# Patient Record
Sex: FEMALE | Race: WHITE | NOT HISPANIC OR LATINO
[De-identification: names, ages, dates, MRNs, and addresses within clinical notes are randomized per-mention and may not be internally consistent; named-entity substitution may affect disease eponyms.]

---

## 2022-12-27 PROBLEM — Z00.00 ENCOUNTER FOR PREVENTIVE HEALTH EXAMINATION: Status: ACTIVE | Noted: 2022-12-27

## 2023-01-04 ENCOUNTER — NON-APPOINTMENT (OUTPATIENT)
Age: 63
End: 2023-01-04

## 2023-01-04 ENCOUNTER — APPOINTMENT (OUTPATIENT)
Dept: VASCULAR SURGERY | Facility: CLINIC | Age: 63
End: 2023-01-04
Payer: COMMERCIAL

## 2023-01-04 VITALS
WEIGHT: 213 LBS | DIASTOLIC BLOOD PRESSURE: 90 MMHG | SYSTOLIC BLOOD PRESSURE: 149 MMHG | BODY MASS INDEX: 36.37 KG/M2 | HEIGHT: 64 IN | HEART RATE: 62 BPM

## 2023-01-04 DIAGNOSIS — I10 ESSENTIAL (PRIMARY) HYPERTENSION: ICD-10-CM

## 2023-01-04 DIAGNOSIS — I83.93 ASYMPTOMATIC VARICOSE VEINS OF BILATERAL LOWER EXTREMITIES: ICD-10-CM

## 2023-01-04 PROCEDURE — 93971 EXTREMITY STUDY: CPT

## 2023-01-04 PROCEDURE — 99204 OFFICE O/P NEW MOD 45 MIN: CPT

## 2023-01-04 RX ORDER — LOSARTAN POTASSIUM 50 MG/1
50 TABLET, FILM COATED ORAL
Refills: 0 | Status: ACTIVE | COMMUNITY

## 2023-01-06 NOTE — PHYSICAL EXAM
[Varicose Veins Of Lower Extremities] : present [Varicose Veins Of The Left Leg] : of the left leg [Ankle Swelling On The Left] : moderate [Ankle Swelling (On Exam)] : not present [] : not present [de-identified] : WN/WD [FreeTextEntry1] : LLE: varicose vein located at the distal thigh and calf.  [de-identified] : FROM

## 2023-01-06 NOTE — HISTORY OF PRESENT ILLNESS
[FreeTextEntry1] : 61 y/o F referred by Dr Corrigan. She has PMHx of HTN. Patient presents for evaluation of SVT in the L leg. She describes veins on her L distal thigh and calf were large and bulging a few weeks ago. She went to her pcp for leg swelling on 11/24/22 and was prescribed blood thinner Eliquis. She had a venous duplex of the LEs done on 12/08/22 which confirmed evidence of SVT in the LLE, negative for DVT. Since on Eliquis patient noticed bulging veins have significantly gotten smaller and improved. Denies any weight loss,weight change, or abdominal pain. No Fhx of DVT, denies any trauma. \par  \par Patient denies any recent Colonoscopy, CKR or mammogram workup. \par  \par [facility] \[facility] \par Fhx: \par -Mother passed \par -Father passed in 2009 from CHF. \par -Sister is healthy and alive. She has Hashimoto's disease and asthma. \par

## 2023-01-06 NOTE — ADDENDUM
[FreeTextEntry1] : I, Dr. Cristóbal Swift, personally performed the evaluation and management (E/M) services for this new patient.  That E/M includes conducting the initial examination, assessing all conditions, and establishing the plan of care.  Today, my ACP, Izabella Gregory NP, was here to observe my evaluation and management services for this patient to be followed going forward. \par \par The documentation for this encounter was entered by Norma Pink acting as a scribe for Dr.Gary Swift.\par

## 2023-01-06 NOTE — ASSESSMENT
[FreeTextEntry1] : 61 y/o F w/ positive SVT in the LLE. On exam, legs well perfused. LLE: varicose vein located at the distal thigh and calf. \par Venous duplex  today shows LLE localized SVT in the vv at distal thigh. Negative DVT. No deep reflux. GSV no reflux, branch of GSV with vv +SVT localized  to distal thigh. \par Patient reassured no further treatment is needed for SVT. Patient can stop blood thinner today. She will F/U in 1 week for repeat scans to evaluate LLE SVT propagation. Patient recommended for further workup of colonoscopy, CKR, mammogram, and hematologist work up to determine SVT etiology.  \par

## 2023-01-06 NOTE — PROCEDURE
[FreeTextEntry1] : Venous duplex ordered to r/o insufficiency and eval vv, LLE localized SVT in the vv at distal thigh. Negative DVT. No deep reflux. GSV no reflux, branch of GSV with vv +SVT localized  to distal thigh.

## 2023-01-11 ENCOUNTER — APPOINTMENT (OUTPATIENT)
Dept: VASCULAR SURGERY | Facility: CLINIC | Age: 63
End: 2023-01-11
Payer: COMMERCIAL

## 2023-01-25 ENCOUNTER — APPOINTMENT (OUTPATIENT)
Dept: VASCULAR SURGERY | Facility: CLINIC | Age: 63
End: 2023-01-25
Payer: COMMERCIAL

## 2023-01-25 VITALS
DIASTOLIC BLOOD PRESSURE: 85 MMHG | BODY MASS INDEX: 36.37 KG/M2 | WEIGHT: 213 LBS | SYSTOLIC BLOOD PRESSURE: 147 MMHG | HEIGHT: 64 IN | HEART RATE: 75 BPM

## 2023-01-25 PROCEDURE — 99213 OFFICE O/P EST LOW 20 MIN: CPT

## 2023-01-25 NOTE — PROCEDURE
[FreeTextEntry1] : Venous duplex ordered to r/o propagation of thrombosis off of AC therapy:\par  LLE localized SVT in the vv at distal thigh. Negative DVT. No deep reflux.

## 2023-02-01 NOTE — PHYSICAL EXAM
[2+] : left 2+ [Ankle Swelling (On Exam)] : not present [] : not present [de-identified] : WN/WD [FreeTextEntry1] : Small, indurated varicose vein located at the medial, distal thigh, significant improvement from 2 weeks ago. No erythema or other signs of phlebitis. [de-identified] : FROM

## 2023-02-01 NOTE — DATA REVIEWED
[FreeTextEntry1] : Venous duplex 1/4/23:  LLE localized SVT in the vv at distal thigh. Negative DVT. No deep reflux. GSV no reflux, branch of GSV with vv +SVT localized to distal thigh.

## 2023-02-01 NOTE — CONSULT LETTER
[Dear  ___] : Dear  [unfilled], [FreeTextEntry2] : Uri Corrigan, \par [address] [facility]\[place] [address]\par \par Sam Britton CNM, NP, MS\par [address] [facility]\[place] [address] [FreeTextEntry1] : I saw Ms Karma Fischer for followup. She presented originally to my office January 4th, 2023 with a left leg superficial thrombosis and had been started on anticoagulation prior to the appointment. Deep vein thrombosis was excluded on ultrasound and she was advised to stop anticoagulation therapy. Also, she was advised to apply warm compresses on the area and see us in a couple of weeks. Further more, she was advised to complete malignancy and hypercoagulability workup. \par \par On exam, legs are well perfused. Small, indurated varicose vein located on the medial, distal thigh has significantly improved since 2 weeks ago and it is almost resolved. No erythema or other signs of phlebitis. Skin intact. I reviewed last ultrasound. \par \par I highly encouraged Ms Fischer to have a thorough examination, including mammography, colonoscopy (she has never had one done), chest x-ray and hypercoagulable workup to exclude any underlying malignancy or hypercoagulable disorder to explain the etiology of the superficial thrombotic episode. From my stand-point, she may remain off of anticoagulation and she may see me as needed.  \par \par My complete EMR office note is below for your records.  [FreeTextEntry3] : Sincerely, \par \par Cristóbal Swift M.D. \par , Surgical Services [facility]\par , Department of Surgery [facility]\par Professor of Surgery, [employer] at [facility]

## 2023-02-01 NOTE — ASSESSMENT
[FreeTextEntry1] : 63 y/o F w/ SVT in the LLE. Reviewed previous duplex. On exam, legs well perfused. Small, indurated varicose vein located at the medial, distal thigh, significant improvement from 2 weeks ago. No erythema or other signs of phlebitis.\par Patient reassured she can remain off of AC therapy. She may apply warm compresses on the area to help the indurated varices resolve faster. No duplex needed today given improvement in area. She can F/U if symptoms return.\par Discussed importance of further workup to r/o any malignancy, including colonoscopy (pt has never had one done), CXR, mammogram, and hematologist work up to exclude any hypercoagulable disorder and/or malignancy as the etiology of the SVT. Referral for Dr Quan provided.

## 2023-02-01 NOTE — ADDENDUM
[FreeTextEntry1] : I, Dr. Cristóbal Swift, personally performed the evaluation and management (E/M) services for this established patient who presents today with (a) new problem(s)/exacerbation of (an) existing condition(s).  That E/M includes conducting the examination, assessing all new/exacerbated conditions, and establishing a new plan of care.  Today, my ACP, Izabella Gregory NP, was here to observe my evaluation and management services for this new problem/exacerbated condition to be followed going forward.

## 2023-02-01 NOTE — HISTORY OF PRESENT ILLNESS
[FreeTextEntry1] : 61 y/o F referred by Dr Corrigan. She has PMHx of HTN. She was diagnosed with a LLE blood clot and had been started on Eliquis. She then presented here 1/4/23 and we advised her to stop Eliquis given the blood clot was only affecting superficial varicose veins with no evidence of thrombosis in the deep veins. She presents for repeat duplex to ensure there is no propagation of thrombus off of AC therapy. She reports some sensitivity over the thrombus site on the L medial thigh, otherwise no pain/discomfort. She still feels a lump over the area but no erythema, fever/chills, SOB, CP.\par  \par During initial appt, she was advised to PCP for further workup, including colonoscopy, mammography, CXR given the unclear etiology of the SVT. Also, pt recommended to see a hematologist for hypercoagulable workup. Today, she mentions upcoming appointments with her PCP and OB/GYN. She would like a referral for a hematologist.\par \par \par SHx:\par -[facility] \par \par Fhx: \par -Mother passed \par -Father passed in 2009 from CHF. \par -Sister is healthy and alive. She has Hashimoto's disease and asthma. \par

## 2023-03-20 ENCOUNTER — APPOINTMENT (OUTPATIENT)
Dept: HEMATOLOGY ONCOLOGY | Facility: CLINIC | Age: 63
End: 2023-03-20
Payer: COMMERCIAL

## 2023-03-20 VITALS
HEART RATE: 76 BPM | DIASTOLIC BLOOD PRESSURE: 78 MMHG | SYSTOLIC BLOOD PRESSURE: 130 MMHG | OXYGEN SATURATION: 97 % | HEIGHT: 64 IN | WEIGHT: 205 LBS | TEMPERATURE: 96.2 F | BODY MASS INDEX: 35 KG/M2

## 2023-03-20 DIAGNOSIS — Z82.5 FAMILY HISTORY OF ASTHMA AND OTHER CHRONIC LOWER RESPIRATORY DISEASES: ICD-10-CM

## 2023-03-20 DIAGNOSIS — Z13.89 ENCOUNTER FOR SCREENING FOR OTHER DISORDER: ICD-10-CM

## 2023-03-20 DIAGNOSIS — Z82.61 FAMILY HISTORY OF ARTHRITIS: ICD-10-CM

## 2023-03-20 DIAGNOSIS — Z80.7 FAMILY HISTORY OF OTHER MALIGNANT NEOPLASMS OF LYMPHOID, HEMATOPOIETIC AND RELATED TISSUES: ICD-10-CM

## 2023-03-20 DIAGNOSIS — Z78.9 OTHER SPECIFIED HEALTH STATUS: ICD-10-CM

## 2023-03-20 DIAGNOSIS — Z82.49 FAMILY HISTORY OF ISCHEMIC HEART DISEASE AND OTHER DISEASES OF THE CIRCULATORY SYSTEM: ICD-10-CM

## 2023-03-20 DIAGNOSIS — Z83.438 FAMILY HISTORY OF OTHER DISORDER OF LIPOPROTEIN METABOLISM AND OTHER LIPIDEMIA: ICD-10-CM

## 2023-03-20 DIAGNOSIS — Z82.69 FAMILY HISTORY OF OTHER DISEASES OF THE MUSCULOSKELETAL SYSTEM AND CONNECTIVE TISSUE: ICD-10-CM

## 2023-03-20 DIAGNOSIS — Z83.49 FAMILY HISTORY OF OTHER ENDOCRINE, NUTRITIONAL AND METABOLIC DISEASES: ICD-10-CM

## 2023-03-20 DIAGNOSIS — E53.8 DEFICIENCY OF OTHER SPECIFIED B GROUP VITAMINS: ICD-10-CM

## 2023-03-20 DIAGNOSIS — R76.8 OTHER SPECIFIED ABNORMAL IMMUNOLOGICAL FINDINGS IN SERUM: ICD-10-CM

## 2023-03-20 DIAGNOSIS — D68.59 OTHER PRIMARY THROMBOPHILIA: ICD-10-CM

## 2023-03-20 DIAGNOSIS — N84.1 POLYP OF CERVIX UTERI: ICD-10-CM

## 2023-03-20 DIAGNOSIS — I80.02 PHLEBITIS AND THROMBOPHLEBITIS OF SUPERFICIAL VESSELS OF LEFT LOWER EXTREMITY: ICD-10-CM

## 2023-03-20 DIAGNOSIS — Z87.19 PERSONAL HISTORY OF OTHER DISEASES OF THE DIGESTIVE SYSTEM: ICD-10-CM

## 2023-03-20 PROCEDURE — 36415 COLL VENOUS BLD VENIPUNCTURE: CPT

## 2023-03-20 PROCEDURE — 99204 OFFICE O/P NEW MOD 45 MIN: CPT | Mod: 25

## 2023-03-20 PROCEDURE — 96372 THER/PROPH/DIAG INJ SC/IM: CPT

## 2023-03-20 RX ORDER — APIXABAN 5 MG/1
5 TABLET, FILM COATED ORAL
Refills: 0 | Status: DISCONTINUED | COMMUNITY
End: 2023-03-20

## 2023-03-20 RX ORDER — CYANOCOBALAMIN 1000 UG/ML
1000 INJECTION INTRAMUSCULAR; SUBCUTANEOUS
Qty: 0 | Refills: 0 | Status: COMPLETED | OUTPATIENT
Start: 2023-03-20

## 2023-03-20 RX ADMIN — CYANOCOBALAMIN 1 MCG/ML: 1000 INJECTION, SOLUTION INTRAMUSCULAR at 00:00

## 2023-03-20 NOTE — ASSESSMENT
[FreeTextEntry1] : Patient is a 63 year old female with a history of hypertension, low level B12, vitamin D deficiency, varicose veins, and superficial thrombophlebitis of the left lower extremity referred for evaluation of possible hypercoagulable state. Have ordered Anticardiolipin Antibody level, Anticardiolipin IgG and IgM, Antithrombin III Assay, B12 and folate,  Beta 2 Glycoprotein 1 IgA , IgG and IgM , Cardiolipin Antibody IgA, CBC with differential, CMP, Factor V Leiden, Homocysteine, manual differential, protein C activity/antigen, Protein S Antigen assay, Protein S free activity, and prothrombin gene mutation. Venipuncture was performed in the office today. Have administered B12 1000 mcg intramuscularly to the right deltoid without incident. The patient was advised to call the office to discuss results and further management.

## 2023-03-20 NOTE — PHYSICAL EXAM
[Obese] : obese [Normal] : affect appropriate [de-identified] : Lower extremity varicosities, slight tenderness distal medial left thigh, mild erythema distal lower extremities

## 2023-03-20 NOTE — REASON FOR VISIT
[Initial Consultation] : an initial consultation for [FreeTextEntry2] : Superficial vein thrombophlebitis, encounter for other disorder, hypercoagulable state, low B12

## 2023-03-20 NOTE — CONSULT LETTER
[Dear  ___] : Dear  [unfilled], [Consult Letter:] : I had the pleasure of evaluating your patient, [unfilled]. [( Thank you for referring [unfilled] for consultation for _____ )] : Thank you for referring [unfilled] for consultation for [unfilled] [Please see my note below.] : Please see my note below. [Consult Closing:] : Thank you very much for allowing me to participate in the care of this patient.  If you have any questions, please do not hesitate to contact me. [Sincerely,] : Sincerely, [FreeTextEntry3] : Elisabeth Quan

## 2023-03-20 NOTE — REVIEW OF SYSTEMS
[Fatigue] : fatigue [Lower Ext Edema] : lower extremity edema [Joint Pain] : joint pain [Joint Stiffness] : joint stiffness [Negative] : Allergic/Immunologic [Fever] : no fever [Chills] : no chills [Night Sweats] : no night sweats [Recent Change In Weight] : ~T no recent weight change [Vision Problems] : no vision problems [Nosebleeds] : no nosebleeds [Chest Pain] : no chest pain [Palpitations] : no palpitations [Shortness Of Breath] : no shortness of breath [Abdominal Pain] : no abdominal pain [Skin Rash] : no skin rash [Easy Bleeding] : no tendency for easy bleeding [Easy Bruising] : no tendency for easy bruising [FreeTextEntry9] : Body aches

## 2023-03-20 NOTE — HISTORY OF PRESENT ILLNESS
[de-identified] : Patient is a 63 year old female with a history of hypertension, low level B12, vitamin D deficiency, varicose veins, and superficial thrombophlebitis of the left lower extremity referred for evaluation of possible hypercoagulable state. In November 2022, patient developed pain and swelling in the left lower extremity and was treated with Eliquis for several weeks. She was then reevaluated and found to have improvement in the varicose vein with less induration and no other signs of progression. At that point, she was advised to discontinue anticoagulation and she continued  symptomatic treatment. Patient denies any prolonged travel prior to the incident.  Her job necessitates long periods of sitting.There is no immediate family history of thrombotic events but patient has a second cousin who had a pulmonary embolism in her 40s.  Patient states she recently had a negative mammogram but has not scheduled a colonoscopy as yet. Patient reports that she recently had a endometrial biopsy and was found to have a cervical polyp, with other results pending. Today, the patient complains of fatigue and intermittent leg cramps. She states that she keeps her legs elevated to prevent swelling. Denies fever, chills, sweats, abdominal pain, easy bruising, vision changes, chest pain, or other infections.

## 2023-03-21 LAB
ALBUMIN SERPL ELPH-MCNC: 3.9 G/DL
ALP BLD-CCNC: 59 U/L
ALT SERPL-CCNC: 14 U/L
ANION GAP SERPL CALC-SCNC: 12 MMOL/L
AST SERPL-CCNC: 13 U/L
AT III PPP CHRO-ACNC: 95 %
BASOPHILS # BLD AUTO: 0 K/UL
BASOPHILS # BLD AUTO: 0 K/UL
BASOPHILS NFR BLD AUTO: 0 %
BASOPHILS NFR BLD AUTO: 0 %
BILIRUB SERPL-MCNC: 0.2 MG/DL
BUN SERPL-MCNC: 19 MG/DL
BURR CELLS BLD QL SMEAR: PRESENT
CALCIUM SERPL-MCNC: 8.9 MG/DL
CHLORIDE SERPL-SCNC: 106 MMOL/L
CO2 SERPL-SCNC: 25 MMOL/L
CREAT SERPL-MCNC: 0.59 MG/DL
EGFR: 101 ML/MIN/1.73M2
EOSINOPHIL # BLD AUTO: 0.08 K/UL
EOSINOPHIL # BLD AUTO: 0.08 K/UL
EOSINOPHIL NFR BLD AUTO: 0.9 %
EOSINOPHIL NFR BLD AUTO: 0.9 %
FOLATE SERPL-MCNC: 19.8 NG/ML
GLUCOSE SERPL-MCNC: 188 MG/DL
HCT VFR BLD CALC: 38.3 %
HCYS SERPL-MCNC: 6.4 UMOL/L
HGB BLD-MCNC: 12 G/DL
LYMPHOCYTES # BLD AUTO: 1.82 K/UL
LYMPHOCYTES # BLD AUTO: 1.82 K/UL
LYMPHOCYTES NFR BLD AUTO: 20.9 %
LYMPHOCYTES NFR BLD AUTO: 20.9 %
MAN DIFF?: NORMAL
MCHC RBC-ENTMCNC: 25.7 PG
MCHC RBC-ENTMCNC: 31.3 GM/DL
MCV RBC AUTO: 82 FL
MONOCYTES # BLD AUTO: 0.23 K/UL
MONOCYTES # BLD AUTO: 0.23 K/UL
MONOCYTES NFR BLD AUTO: 2.6 %
MONOCYTES NFR BLD AUTO: 2.6 %
MSMEAR: NORMAL
NEUTROPHILS # BLD AUTO: 6.57 K/UL
NEUTROPHILS # BLD AUTO: 6.57 K/UL
NEUTROPHILS NFR BLD AUTO: 75.6 %
NEUTROPHILS NFR BLD AUTO: 75.6 %
OVALOCYTES BLD QL SMEAR: SLIGHT
PLAT MORPH BLD: ABNORMAL
PLATELET # BLD AUTO: 321 K/UL
POIKILOCYTOSIS BLD QL SMEAR: SLIGHT
POLYCHROMASIA BLD QL SMEAR: SLIGHT
POTASSIUM SERPL-SCNC: 3.6 MMOL/L
PROT C PPP CHRO-ACNC: 114 %
PROT S AG ACT/NOR PPP IA: 83 %
PROT SERPL-MCNC: 6.7 G/DL
RBC # BLD: 4.67 M/UL
RBC # FLD: 13.9 %
RBC BLD AUTO: ABNORMAL
SODIUM SERPL-SCNC: 143 MMOL/L
SPHEROCYTES BLD QL SMEAR: SLIGHT
VIT B12 SERPL-MCNC: 333 PG/ML
WBC # FLD AUTO: 8.69 K/UL

## 2023-03-22 LAB
B2 GLYCOPROT1 IGA SERPL IA-ACNC: <5 SAU
B2 GLYCOPROT1 IGG SER-ACNC: <5 SGU
B2 GLYCOPROT1 IGM SER-ACNC: <5 SMU
CARDIOLIPIN AB SER IA-ACNC: NEGATIVE
CARDIOLIPIN IGM SER-MCNC: 10.3 GPL
CARDIOLIPIN IGM SER-MCNC: 9.5 MPL
DEPRECATED CARDIOLIPIN IGA SER: <5 APL

## 2023-03-23 LAB
DNA PLOIDY SPEC FC-IMP: NORMAL
PROT S FREE PPP-ACNC: 80 %
PTR INTERP: NORMAL